# Patient Record
Sex: FEMALE | URBAN - METROPOLITAN AREA
[De-identification: names, ages, dates, MRNs, and addresses within clinical notes are randomized per-mention and may not be internally consistent; named-entity substitution may affect disease eponyms.]

---

## 2018-06-29 ENCOUNTER — TELEPHONE (OUTPATIENT)
Dept: OTOLARYNGOLOGY | Facility: CLINIC | Age: 48
End: 2018-06-29

## 2018-06-29 NOTE — TELEPHONE ENCOUNTER
----- Message from Ness Rodríguez sent at 6/29/2018 12:10 PM CDT -----  Contact: Patient  Regina, patient [phone] calling to speak with nurse before scheduling an appointment to see if Dr. 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 Placed call to POD, no answer. Please advise. Thanks.

## 2018-07-10 ENCOUNTER — LAB VISIT (OUTPATIENT)
Dept: LAB | Facility: HOSPITAL | Age: 48
End: 2018-07-10
Attending: OTOLARYNGOLOGY
Payer: MEDICARE

## 2018-07-10 ENCOUNTER — OFFICE VISIT (OUTPATIENT)
Dept: OTOLARYNGOLOGY | Facility: CLINIC | Age: 48
End: 2018-07-10
Payer: MEDICARE

## 2018-07-10 VITALS — HEIGHT: 66 IN | BODY MASS INDEX: 27.88 KG/M2 | WEIGHT: 173.5 LBS

## 2018-07-10 DIAGNOSIS — D49.0 NEOPLASM OF PAROTID GLAND: ICD-10-CM

## 2018-07-10 LAB
CREAT SERPL-MCNC: 0.7 MG/DL
EST. GFR  (AFRICAN AMERICAN): >60 ML/MIN/1.73 M^2
EST. GFR  (NON AFRICAN AMERICAN): >60 ML/MIN/1.73 M^2

## 2018-07-10 PROCEDURE — 82565 ASSAY OF CREATININE: CPT

## 2018-07-10 PROCEDURE — 36415 COLL VENOUS BLD VENIPUNCTURE: CPT | Mod: PO

## 2018-07-10 PROCEDURE — 99204 OFFICE O/P NEW MOD 45 MIN: CPT | Mod: S$GLB,,, | Performed by: OTOLARYNGOLOGY

## 2018-07-10 PROCEDURE — 3008F BODY MASS INDEX DOCD: CPT | Mod: CPTII,S$GLB,, | Performed by: OTOLARYNGOLOGY

## 2018-07-10 PROCEDURE — 99999 PR PBB SHADOW E&M-EST. PATIENT-LVL III: CPT | Mod: PBBFAC,,, | Performed by: OTOLARYNGOLOGY

## 2018-07-10 RX ORDER — EZETIMIBE 10 MG/1
10 TABLET ORAL DAILY
Refills: 6 | COMMUNITY
Start: 2018-06-21

## 2018-07-10 NOTE — PROGRESS NOTES
Subjective:       Patient ID: Regina Greenwood is a 48 y.o. female.    Chief Complaint: parotid mass    Regina is here for parotid mass. Symptoms have been present for an unknown period of time.  She has c-spine issues. She had swelling on the left side of her neck 3 weeks ago and had an ultrasound of the area and they incidentally found a right parotid mass.  She denies previous knowledge of parotid or SMG gland neoplasms but has reported left SMG sialolith occasionally that has never needed surgery.     She does report a bad c-spine. She has psoriasis.  Previous surgery: no head or neck surgery.  No bleed thinners  She smokes    History   Smoking Status    Current Every Day Smoker   Smokeless Tobacco    Not on file     History   Alcohol use Not on file          Review of Systems   Constitutional: Negative for activity change and appetite change.   Eyes: Negative for discharge.   Respiratory: Negative for difficulty breathing and wheezing   Cardiovascular: Negative for chest pain.   Gastrointestinal: Negative for abdominal distention and abdominal pain.   Endocrine: Negative for cold intolerance and heat intolerance.   Genitourinary: Negative for dysuria.   Musculoskeletal: Negative for gait problem and joint swelling.   Skin: Negative for color change and pallor.   Neurological: Negative for syncope and weakness.   Psychiatric/Behavioral: Negative for agitation and confusion.     Objective:        Constitutional:   She is oriented to person, place, and time. She appears well-developed and well-nourished. She appears alert. She is active. Normal speech.      Head:  Normocephalic and atraumatic. Head is without TMJ tenderness. No scars. Salivary glands normal.  Facial strength is normal.    1.5-2 cm firm nodule tail of right parotid/Level II, near SCM, below lobule.     Ears:    Right Ear: No drainage or swelling. No middle ear effusion.   Left Ear: No drainage or swelling.  No middle ear effusion.     Nose:  No  mucosal edema, rhinorrhea or sinus tenderness. No turbinate hypertrophy.      Mouth/Throat  Oropharynx clear and moist without lesions or asymmetry, normal uvula midline and mirror exam normal. Normal dentition. No uvula swelling, lacerations or trismus. No oropharyngeal exudate. Tonsillar erythema, tonsillar exudate.      Neck:  Full range of motion with neck supple and no adenopathy. Thyroid tenderness is present. No tracheal deviation, no edema, no erythema, normal range of motion, no stridor, no crepitus and no neck rigidity present. No thyroid mass present.     Cardiovascular:   Intact distal pulses and normal pulses.      Pulmonary/Chest:   Effort normal and breath sounds normal. No stridor.     Psychiatric:   Her speech is normal and behavior is normal. Her mood appears not anxious. Her affect is not labile.     Neurological:   She is alert and oriented to person, place, and time. No sensory deficit.     Skin:   No abrasions, lacerations, lesions, or rashes. No abrasion and no bruising noted.         Tests / Results:  I personally reviewed the ultrasound of parotid and my findings reveal:    1.8x1.5x1.2 cm lateral right parotid lobe lesion with internal vascularity. Lobulated hypoechoic, at junction of parotid gland    Assessment:       1. Neoplasm of parotid gland          Plan:         CT Neck with contrast to evaluate relation to parotid vs. Level II  Recommend FNA, maybe same day if able  Will call with results

## 2018-07-17 ENCOUNTER — HOSPITAL ENCOUNTER (OUTPATIENT)
Dept: RADIOLOGY | Facility: HOSPITAL | Age: 48
Discharge: HOME | End: 2018-07-17
Attending: OTOLARYNGOLOGY
Payer: MEDICARE

## 2018-07-17 DIAGNOSIS — D49.0 NEOPLASM OF PAROTID GLAND: ICD-10-CM

## 2018-07-17 PROCEDURE — 25500020 PHARM REV CODE 255: Mod: PO | Performed by: OTOLARYNGOLOGY

## 2018-07-17 PROCEDURE — 70491 CT SOFT TISSUE NECK W/DYE: CPT | Mod: TC,PO

## 2018-07-17 PROCEDURE — 70491 CT SOFT TISSUE NECK W/DYE: CPT | Mod: 26,,, | Performed by: RADIOLOGY

## 2018-07-17 RX ADMIN — IOHEXOL 75 ML: 350 INJECTION, SOLUTION INTRAVENOUS at 08:07

## 2018-07-24 ENCOUNTER — HOSPITAL ENCOUNTER (OUTPATIENT)
Dept: RADIOLOGY | Facility: HOSPITAL | Age: 48
Discharge: HOME | End: 2018-07-24
Attending: OTOLARYNGOLOGY
Payer: MEDICARE

## 2018-07-24 DIAGNOSIS — D49.0 NEOPLASM OF PAROTID GLAND: ICD-10-CM

## 2018-07-24 PROCEDURE — 27201068 US BIOPSY PAROTID SALIVARY GLAND (XPD): Mod: PO

## 2018-07-24 PROCEDURE — 76942 ECHO GUIDE FOR BIOPSY: CPT | Mod: 26,,, | Performed by: RADIOLOGY

## 2018-07-24 PROCEDURE — 42400 BIOPSY OF SALIVARY GLAND: CPT | Mod: ,,, | Performed by: RADIOLOGY

## 2018-07-24 PROCEDURE — 88305 TISSUE EXAM BY PATHOLOGIST: CPT | Performed by: PATHOLOGY

## 2018-07-27 ENCOUNTER — PATIENT MESSAGE (OUTPATIENT)
Dept: OTOLARYNGOLOGY | Facility: CLINIC | Age: 48
End: 2018-07-27

## 2018-07-27 DIAGNOSIS — D11.9 WARTHIN'S TUMOR: ICD-10-CM

## 2018-07-27 DIAGNOSIS — D49.0 NEOPLASM OF PAROTID GLAND: ICD-10-CM
